# Patient Record
Sex: FEMALE | Race: ASIAN | NOT HISPANIC OR LATINO | Employment: FULL TIME | URBAN - METROPOLITAN AREA
[De-identification: names, ages, dates, MRNs, and addresses within clinical notes are randomized per-mention and may not be internally consistent; named-entity substitution may affect disease eponyms.]

---

## 2018-01-16 ENCOUNTER — OFFICE VISIT (OUTPATIENT)
Dept: URGENT CARE | Facility: URGENT CARE | Age: 48
End: 2018-01-16
Payer: COMMERCIAL

## 2018-01-16 VITALS
RESPIRATION RATE: 20 BRPM | DIASTOLIC BLOOD PRESSURE: 78 MMHG | TEMPERATURE: 98.6 F | WEIGHT: 192.6 LBS | HEART RATE: 94 BPM | SYSTOLIC BLOOD PRESSURE: 112 MMHG

## 2018-01-16 DIAGNOSIS — L98.9 SKIN LESION: Primary | ICD-10-CM

## 2018-01-16 PROCEDURE — 99203 OFFICE O/P NEW LOW 30 MIN: CPT | Performed by: PHYSICIAN ASSISTANT

## 2018-01-16 RX ORDER — HYDROXYZINE HYDROCHLORIDE 25 MG/1
25-50 TABLET, FILM COATED ORAL EVERY 6 HOURS PRN
Qty: 30 TABLET | Refills: 0 | Status: SHIPPED | OUTPATIENT
Start: 2018-01-16

## 2018-01-16 RX ORDER — PREDNISONE 20 MG/1
20 TABLET ORAL DAILY
Qty: 5 TABLET | Refills: 0 | Status: SHIPPED | OUTPATIENT
Start: 2018-01-16

## 2018-01-16 RX ORDER — TRIAMCINOLONE ACETONIDE 5 MG/G
CREAM TOPICAL
Qty: 30 G | Refills: 0 | Status: SHIPPED | OUTPATIENT
Start: 2018-01-16

## 2018-01-16 RX ORDER — CEPHALEXIN 500 MG/1
500 CAPSULE ORAL 3 TIMES DAILY
Qty: 30 CAPSULE | Refills: 0 | Status: SHIPPED | OUTPATIENT
Start: 2018-01-16

## 2018-01-16 NOTE — MR AVS SNAPSHOT
"              After Visit Summary   1/16/2018    Carissa Gordillo    MRN: [id number]           Patient Information     Date Of Birth          1970        Visit Information        Provider Department      1/16/2018 6:00 PM Jaquelin Mccullough PA-C [facility]        Today's Diagnoses     Skin lesion    -  1      Care Instructions    (L98.9) Skin lesion  (primary encounter diagnosis)  Comment:   Plan: predniSONE (DELTASONE) 20 MG tablet,         hydrOXYzine (ATARAX) 25 MG tablet, cephALEXin         (KEFLEX) 500 MG capsule, triamcinolone         (KENALOG) 0.5 % cream            Use Prednisone, Atarax and topical steroid tonight.  Start antibiotic should symptoms persist or worsen.                Follow-ups after your visit        Who to contact     If you have questions or need follow up information about today's clinic visit or your schedule please contact [facility] directly at [phone].  Normal or non-critical lab and imaging results will be communicated to you by Franklyhart, letter or phone within 4 business days after the clinic has received the results. If you do not hear from us within 7 days, please contact the clinic through Franklyhart or phone. If you have a critical or abnormal lab result, we will notify you by phone as soon as possible.  Submit refill requests through [email or web address] or call your pharmacy and they will forward the refill request to us. Please allow 3 business days for your refill to be completed.          Additional Information About Your Visit        Franklyhart Information     [email or web address] lets you send messages to your doctor, view your test results, renew your prescriptions, schedule appointments and more. To sign up, go to [email or web address] . Click on \"Log in\" on the left side of the screen, which will take you to the Welcome page. Then click on \"Sign up Now\" on the right side of the page.     You will be asked to enter the " access code listed below, as well as some personal information. Please follow the directions to create your username and password.     Your access code is: [email or web address]  Expires: 2018  8:24 PM     Your access code will  in 90 days. If you need help or a new code, please call your [place] clinic or [phone].        Care EveryWhere ID     This is your Care EveryWhere ID. This could be used by other organizations to access your [place] medical records  [id number]        Your Vitals Were     Pulse Temperature Respirations Last Period          94 98.6  F (37  C) (Oral) 20 2017 (Approximate)         Blood Pressure from Last 3 Encounters:   18 112/78    Weight from Last 3 Encounters:   18 192 lb 9.6 oz (87.4 kg)              Today, you had the following     No orders found for display         Today's Medication Changes          These changes are accurate as of: 18  8:24 PM.  If you have any questions, ask your nurse or doctor.               Start taking these medicines.        Dose/Directions    cephALEXin 500 MG capsule   Commonly known as:  KEFLEX   Used for:  Skin lesion   Started by:  Jaquelin Mccullough PA-C        Dose:  500 mg   Take 1 capsule (500 mg) by mouth 3 times daily   Quantity:  30 capsule   Refills:  0       hydrOXYzine 25 MG tablet   Commonly known as:  ATARAX   Used for:  Skin lesion   Started by:  Jaquelin Mccullough PA-C        Dose:  25-50 mg   Take 1-2 tablets (25-50 mg) by mouth every 6 hours as needed for itching   Quantity:  30 tablet   Refills:  0       predniSONE 20 MG tablet   Commonly known as:  DELTASONE   Used for:  Skin lesion   Started by:  Jaquelin Mccullough PA-C        Dose:  20 mg   Take 1 tablet (20 mg) by mouth daily   Quantity:  5 tablet   Refills:  0       triamcinolone 0.5 % cream   Commonly known as:  KENALOG   Used for:  Skin lesion   Started by:  Jaquelin Mccullough PA-C        Apply sparingly to affected area  three times daily.   Quantity:  30 g   Refills:  0            Where to get your medicines      These medications were sent to [facility] [place], [place] - [address]  [address] [facility], [facility] [address]     Phone:  [phone]     hydrOXYzine 25 MG tablet    predniSONE 20 MG tablet    triamcinolone 0.5 % cream         Some of these will need a paper prescription and others can be bought over the counter.  Ask your nurse if you have questions.     Bring a paper prescription for each of these medications     cephALEXin 500 MG capsule                Primary Care Provider Office Phone # Fax #    Jazzy Collier [phone]       [facility] [address]  [facility] [address]        Equal Access to Services     SHILPA BOGGS : Hadii barbara forrest hadasho Soomaali, waaxda luqadaha, qaybta kaalmada adeegyada, waxay liin haymaricel hill . So [facility] [phone].    ATENCIÓN: Si habla español, tiene a guerra disposición servicios gratuitos de asistencia lingüística. [facility] [phone].    We comply with applicable federal civil rights laws and [place] laws. We do not discriminate on the basis of race, color, national origin, age, disability, sex, sexual orientation, or gender identity.            Thank you!     Thank you for choosing [facility]  for your care. Our goal is always to provide you with excellent care. Hearing back from our patients is one way we can continue to improve our services. Please take a few minutes to complete the written survey that you may receive in the mail after your visit with us. Thank you!             Your Updated Medication List - Protect others around you: Learn how to safely use, store and throw away your medicines at www.disposemymeds.org.          This list is accurate as of: 1/16/18  8:24 PM.  Always use your most recent med list.                   Brand Name Dispense Instructions for use  Diagnosis    cephALEXin 500 MG capsule    KEFLEX    30 capsule    Take 1 capsule (500 mg) by mouth 3 times daily    Skin lesion       hydrOXYzine 25 MG tablet    ATARAX    30 tablet    Take 1-2 tablets (25-50 mg) by mouth every 6 hours as needed for itching    Skin lesion       predniSONE 20 MG tablet    DELTASONE    5 tablet    Take 1 tablet (20 mg) by mouth daily    Skin lesion       PROGESTERONE PO           triamcinolone 0.5 % cream    KENALOG    30 g    Apply sparingly to affected area three times daily.    Skin lesion       UNKNOWN TO PATIENT      BP medication

## 2018-01-17 NOTE — PROGRESS NOTES
SUBJECTIVE:  Carissa Gordillo is a 47 year old female who presents to the clinic today for itchy and red and sore lesions on both of her arms for one day.  Low grade fever.  Redness has increased in size around each area since being seen in urgency center yesterday.  Lesions are also painful today.    Denies any injury.  Denies any new meds or topical products.    No h/o similar symptoms.      No past medical history on file.     Denies any significant PMH    Denies any significant FH     Allergies   Allergen Reactions     Contrast Dye      Hives      Sulfa Drugs      Nausea and vomit     Social History   Substance Use Topics     Smoking status: Current Every Day Smoker     Smokeless tobacco: Not on file     Alcohol use Not on file       ROS:  CONSTITUTIONAL:as per HPIt  INTEGUMENTARY/SKIN: as per HPI  ENT/MOUTH: NEGATIVE for ear, mouth and throat problems  RESP:NEGATIVE for significant cough or SOB  CV: NEGATIVE for chest pain, palpitations or peripheral edema  MUSCULOSKELETAL: NEGATIVE for significant arthralgias or myalgia    EXAM:   /78  Pulse 94  Temp 98.6  F (37  C) (Oral)  Resp 20  Wt 192 lb 9.6 oz (87.4 kg)  LMP 12/29/2017 (Approximate)  GENERAL: alert, no acute distress.  SKIN: erythematous and indurated lesions on posterior aspect of distal upper arms bilaterally.  Lesion on right is 3cm in diameter, left is 2cm in diameter.  No open wounds.  NO vesicles.  Tender.    EXT: no bony tenderness.    VASC: cap refill is less than 2 seconds in the affected extremities.   NEURO: sensation intact in affected extremities.      (L98.9) Skin lesion  (primary encounter diagnosis)  Comment:   Plan: predniSONE (DELTASONE) 20 MG tablet,         hydrOXYzine (ATARAX) 25 MG tablet, cephALEXin         (KEFLEX) 500 MG capsule, triamcinolone         (KENALOG) 0.5 % cream            Use Prednisone, Atarax and topical steroid tonight.  Start antibiotic should symptoms persist or worsen.      Patient expresses  understanding and agreement with the assessment and plan as above.

## 2018-01-17 NOTE — PATIENT INSTRUCTIONS
(L98.9) Skin lesion  (primary encounter diagnosis)  Comment:   Plan: predniSONE (DELTASONE) 20 MG tablet,         hydrOXYzine (ATARAX) 25 MG tablet, cephALEXin         (KEFLEX) 500 MG capsule, triamcinolone         (KENALOG) 0.5 % cream            Use Prednisone, Atarax and topical steroid tonight.  Start antibiotic should symptoms persist or worsen.

## 2021-03-30 ENCOUNTER — OFFICE VISIT (OUTPATIENT)
Dept: URGENT CARE | Facility: URGENT CARE | Age: 51
End: 2021-03-30
Payer: COMMERCIAL

## 2021-03-30 VITALS
DIASTOLIC BLOOD PRESSURE: 89 MMHG | WEIGHT: 191 LBS | TEMPERATURE: 97.9 F | SYSTOLIC BLOOD PRESSURE: 126 MMHG | RESPIRATION RATE: 20 BRPM | OXYGEN SATURATION: 100 % | HEART RATE: 95 BPM
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
## 2021-03-30 PROCEDURE — 99203 OFFICE O/P NEW LOW 30 MIN: CPT | Performed by: FAMILY MEDICINE

## 2021-03-30 RX ORDER — LISINOPRIL/HYDROCHLOROTHIAZIDE 10-12.5 MG
1 TABLET ORAL
COMMUNITY
Start: 2020-11-04

## 2021-03-30 RX ORDER — EPINEPHRINE 0.3 MG/.3ML
0.3 INJECTION SUBCUTANEOUS
COMMUNITY
Start: 2019-07-02

## 2021-03-30 RX ORDER — CETIRIZINE HYDROCHLORIDE 10 MG/1
10 TABLET ORAL DAILY
Qty: 10 TABLET | Refills: 0 | Status: SHIPPED | OUTPATIENT
Start: 2021-03-30 | End: 2021-04-09

## 2021-03-30 RX ORDER — CEPHALEXIN 500 MG/1
500 CAPSULE ORAL 4 TIMES DAILY
Qty: 40 CAPSULE | Refills: 0 | Status: SHIPPED | OUTPATIENT
Start: 2021-03-30 | End: 2021-04-09

## 2021-03-30 RX ORDER — ACETAMINOPHEN AND CODEINE PHOSPHATE 120; 12 MG/5ML; MG/5ML
0.35 SOLUTION ORAL
COMMUNITY
Start: 2020-07-30 | End: 2021-07-30

## 2021-03-31 NOTE — PROGRESS NOTES
SUBJECTIVE: Carissa Gordillo is a 50 year old female presenting with a chief complaint of bug bite and redness/tender.  Onset of symptoms was thsi am ago.  Course of illness is worsening.    Severity moderate  Treatment measures tried include Antihistamine.  Predisposing factors include None.    History reviewed. No pertinent past medical history.  Allergies   Allergen Reactions     Contrast Dye      Hives      Sulfa Drugs      Nausea and vomit     Social History     Tobacco Use     Smoking status: Current Every Day Smoker     Smokeless tobacco: Never Used   Substance Use Topics     Alcohol use: Not Currently       ROS:  SKIN: no rash  GI: no vomiting    OBJECTIVE:  /89   Pulse 95   Temp 97.9  F (36.6  C)   Resp 20   Wt 86.6 kg (191 lb)   LMP  (LMP Unknown)   SpO2 100% GENERAL APPEARANCE: healthy, alert and no distress  SKIN: red rash/tednes rt forearm and left hand and periumbilical      ICD-10-CM    1. Bug bite with infection, initial encounter  W57.XXXA cephALEXin (KEFLEX) 500 MG capsule     cetirizine (ZYRTEC) 10 MG tablet       Fluids/Rest, f/u if worse/not any better

## 2021-08-06 ENCOUNTER — OFFICE VISIT (OUTPATIENT)
Dept: URGENT CARE | Facility: URGENT CARE | Age: 51
End: 2021-08-06
Payer: COMMERCIAL

## 2021-08-06 VITALS
RESPIRATION RATE: 16 BRPM | HEART RATE: 92 BPM | WEIGHT: 189 LBS | TEMPERATURE: 97.9 F | SYSTOLIC BLOOD PRESSURE: 122 MMHG | OXYGEN SATURATION: 100 % | DIASTOLIC BLOOD PRESSURE: 78 MMHG
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
## 2021-08-06 DIAGNOSIS — S90.465A INSECT BITE OF LESSER TOE OF LEFT FOOT, INITIAL ENCOUNTER: Primary | ICD-10-CM

## 2021-08-06 DIAGNOSIS — Z91.018 MULTIPLE FOOD ALLERGIES: ICD-10-CM

## 2021-08-06 PROCEDURE — 99213 OFFICE O/P EST LOW 20 MIN: CPT | Performed by: PHYSICIAN ASSISTANT

## 2021-08-06 RX ORDER — METHYLPREDNISOLONE 4 MG
TABLET, DOSE PACK ORAL
Qty: 21 TABLET | Refills: 0 | Status: SHIPPED | OUTPATIENT
Start: 2021-08-06

## 2021-08-06 RX ORDER — EPINEPHRINE 0.3 MG/.3ML
0.3 INJECTION SUBCUTANEOUS ONCE
Qty: 0.3 ML | Refills: 1 | Status: SHIPPED | OUTPATIENT
Start: 2021-08-06 | End: 2021-08-06

## 2021-08-06 RX ORDER — CEPHALEXIN 500 MG/1
500 CAPSULE ORAL 3 TIMES DAILY
Qty: 30 CAPSULE | Refills: 0 | Status: SHIPPED | OUTPATIENT
Start: 2021-08-06 | End: 2021-08-16

## 2021-08-06 NOTE — PROGRESS NOTES
Patient presents with:  Urgent Care: Allergic Reaction unknown-last night ate [place] for the first time    (S90.465A,  W57.XXXA) Insect bite of lesser toe of left foot, initial encounter  (primary encounter diagnosis)  Comment:   Plan: methylPREDNISolone (MEDROL DOSEPAK) 4 MG tablet        therapy pack, cephALEXin (KEFLEX) 500 MG         capsule            (Z91.018) Multiple food allergies  Comment:   Plan: EPINEPHrine (ANY BX GENERIC EQUIV) 0.3 MG/0.3ML        injection 2-pack                SUBJECTIVE:   Carissa Gordillo is a 50 year old female who presents today with an itchy and swollen crusting set of sores on her left 2nd and 3rd toes since this morning.      Denies any other symptoms.      Has multiple food allergies for which she needs an epi pen.  Her current one is .        Current Outpatient Medications   Medication Sig Dispense Refill     Multiple Vitamins-Iron (DAILY-RABIA/IRON/BETA-CAROTENE) TABS TAKE 1 TABLET BY MOUTH DAILY. (Patient not taking: Reported on 10/19/2020) 30 tablet 7     Social History     Tobacco Use     Smoking status: Never Smoker     Smokeless tobacco: Never Used   Substance Use Topics     Alcohol use: Not on file     Family History   Problem Relation Age of Onset     Diabetes Mother      Diabetes Father          ROS:    10 point ROS of systems including Constitutional, Eyes, Respiratory, Cardiovascular, Gastroenterology, Genitourinary, Integumentary, Muscularskeletal, Psychiatric ,neurological were all negative except for pertinent positives noted in my HPI       OBJECTIVE:  /78   Pulse 92   Temp 97.9  F (36.6  C) (Temporal)   Resp 16   Wt 85.7 kg (189 lb)   SpO2 100%   Physical Exam:  GENERAL APPEARANCE: healthy, alert and no distress  EYES: EOMI,  PERRL, conjunctiva clear  HENT: ear canals and TM's normal.  Nose and mouth without ulcers, erythema or lesions  NECK: supple, nontender, no lymphadenopathy  RESP: lungs clear to auscultation - no rales, rhonchi  or wheezes  CV: regular rates and rhythm, normal S1 S2, no murmur noted  SKIN: erythematous and mildly edematous skin on 2nd and 3rd toes with crusting on left foot.    One excoriated papule on right upper arm..  No other lesions

## 2021-08-06 NOTE — PATIENT INSTRUCTIONS
(S90.465A,  W57.XXXA) Insect bite of lesser toe of left foot, initial encounter  (primary encounter diagnosis)  Comment:   Plan: methylPREDNISolone (MEDROL DOSEPAK) 4 MG tablet        therapy pack, cephALEXin (KEFLEX) 500 MG         Capsule  Take generic zyrtec once daily for a week.      Local wound care daily            (Z91.018) Multiple food allergies  Comment:   Plan: EPINEPHrine (ANY BX GENERIC EQUIV) 0.3 MG/0.3ML        injection 2-pack

## 2021-08-29 ENCOUNTER — HOSPITAL ENCOUNTER (EMERGENCY)
Facility: CLINIC | Age: 51
Discharge: HOME | End: 2021-08-30
Attending: EMERGENCY MEDICINE | Admitting: EMERGENCY MEDICINE
Payer: COMMERCIAL

## 2021-08-29 DIAGNOSIS — U07.1 INFECTION DUE TO 2019 NOVEL CORONAVIRUS: ICD-10-CM

## 2021-08-29 PROCEDURE — 99285 EMERGENCY DEPT VISIT HI MDM: CPT | Mod: 25

## 2021-08-29 PROCEDURE — 93005 ELECTROCARDIOGRAM TRACING: CPT | Performed by: EMERGENCY MEDICINE

## 2021-08-29 RX ORDER — SODIUM CHLORIDE 9 MG/ML
INJECTION, SOLUTION INTRAVENOUS CONTINUOUS
Status: DISCONTINUED | OUTPATIENT
Start: 2021-08-29 | End: 2021-08-30 | Stop reason: HOSPADM

## 2021-08-29 RX ORDER — KETOROLAC TROMETHAMINE 15 MG/ML
15 INJECTION, SOLUTION INTRAMUSCULAR; INTRAVENOUS ONCE
Status: COMPLETED | OUTPATIENT
Start: 2021-08-29 | End: 2021-08-30

## 2021-08-30 ENCOUNTER — APPOINTMENT (OUTPATIENT)
Dept: RADIOLOGY | Facility: CLINIC | Age: 51
End: 2021-08-30
Attending: EMERGENCY MEDICINE
Payer: COMMERCIAL

## 2021-08-30 ENCOUNTER — APPOINTMENT (OUTPATIENT)
Dept: NUCLEAR MEDICINE | Facility: CLINIC | Age: 51
End: 2021-08-30
Attending: EMERGENCY MEDICINE
Payer: COMMERCIAL

## 2021-08-30 VITALS
TEMPERATURE: 99.7 F | OXYGEN SATURATION: 95 % | SYSTOLIC BLOOD PRESSURE: 130 MMHG | RESPIRATION RATE: 19 BRPM | HEART RATE: 90 BPM | DIASTOLIC BLOOD PRESSURE: 62 MMHG

## 2021-08-30 LAB
ANION GAP SERPL CALCULATED.3IONS-SCNC: 14 MMOL/L (ref 5–18)
ATRIAL RATE - MUSE: 99 BPM
BASOPHILS # BLD AUTO: 0 10E3/UL (ref 0–0.2)
BASOPHILS NFR BLD AUTO: 0 %
BUN SERPL-MCNC: 15 MG/DL (ref 8–22)
CALCIUM SERPL-MCNC: 8.9 MG/DL (ref 8.5–10.5)
CHLORIDE BLD-SCNC: 95 MMOL/L (ref 98–107)
CO2 SERPL-SCNC: 25 MMOL/L (ref 22–31)
CREAT SERPL-MCNC: 1 MG/DL (ref 0.6–1.1)
D DIMER PPP FEU-MCNC: 0.98 UG/ML FEU (ref 0–0.5)
DIASTOLIC BLOOD PRESSURE - MUSE: NORMAL MMHG
EOSINOPHIL # BLD AUTO: 0 10E3/UL (ref 0–0.7)
EOSINOPHIL NFR BLD AUTO: 0 %
ERYTHROCYTE [DISTWIDTH] IN BLOOD BY AUTOMATED COUNT: 11.5 % (ref 10–15)
GFR SERPL CREATININE-BSD FRML MDRD: 66 ML/MIN/1.73M2
GLUCOSE BLD-MCNC: 98 MG/DL (ref 70–125)
HCT VFR BLD AUTO: 37.9 % (ref 35–47)
HGB BLD-MCNC: 12.9 G/DL (ref 11.7–15.7)
IMM GRANULOCYTES # BLD: 0 10E3/UL
IMM GRANULOCYTES NFR BLD: 1 %
INTERPRETATION ECG - MUSE: NORMAL
LYMPHOCYTES # BLD AUTO: 0.8 10E3/UL (ref 0.8–5.3)
LYMPHOCYTES NFR BLD AUTO: 15 %
MCH RBC QN AUTO: 31 PG (ref 26.5–33)
MCHC RBC AUTO-ENTMCNC: 34 G/DL (ref 31.5–36.5)
MCV RBC AUTO: 91 FL (ref 78–100)
MONOCYTES # BLD AUTO: 0.5 10E3/UL (ref 0–1.3)
MONOCYTES NFR BLD AUTO: 9 %
NEUTROPHILS # BLD AUTO: 4.1 10E3/UL (ref 1.6–8.3)
NEUTROPHILS NFR BLD AUTO: 75 %
NRBC # BLD AUTO: 0 10E3/UL
NRBC BLD AUTO-RTO: 0 /100
P AXIS - MUSE: 55 DEGREES
PLATELET # BLD AUTO: 227 10E3/UL (ref 150–450)
POTASSIUM BLD-SCNC: 3.6 MMOL/L (ref 3.5–5)
PR INTERVAL - MUSE: 122 MS
QRS DURATION - MUSE: 78 MS
QT - MUSE: 364 MS
QTC - MUSE: 467 MS
R AXIS - MUSE: 66 DEGREES
RBC # BLD AUTO: 4.16 10E6/UL (ref 3.8–5.2)
SODIUM SERPL-SCNC: 134 MMOL/L (ref 136–145)
SYSTOLIC BLOOD PRESSURE - MUSE: NORMAL MMHG
T AXIS - MUSE: 55 DEGREES
VENTRICULAR RATE- MUSE: 99 BPM
WBC # BLD AUTO: 5.4 10E3/UL (ref 4–11)

## 2021-08-30 PROCEDURE — 258N000003 HC RX IP 258 OP 636: Performed by: EMERGENCY MEDICINE

## 2021-08-30 PROCEDURE — 96361 HYDRATE IV INFUSION ADD-ON: CPT

## 2021-08-30 PROCEDURE — 36415 COLL VENOUS BLD VENIPUNCTURE: CPT | Performed by: EMERGENCY MEDICINE

## 2021-08-30 PROCEDURE — 78580 LUNG PERFUSION IMAGING: CPT

## 2021-08-30 PROCEDURE — 80048 BASIC METABOLIC PNL TOTAL CA: CPT | Performed by: EMERGENCY MEDICINE

## 2021-08-30 PROCEDURE — 250N000011 HC RX IP 250 OP 636: Performed by: EMERGENCY MEDICINE

## 2021-08-30 PROCEDURE — 343N000001 HC RX 343: Performed by: EMERGENCY MEDICINE

## 2021-08-30 PROCEDURE — 96374 THER/PROPH/DIAG INJ IV PUSH: CPT | Mod: 59

## 2021-08-30 PROCEDURE — 85379 FIBRIN DEGRADATION QUANT: CPT | Performed by: EMERGENCY MEDICINE

## 2021-08-30 PROCEDURE — 71045 X-RAY EXAM CHEST 1 VIEW: CPT

## 2021-08-30 PROCEDURE — A9540 TC99M MAA: HCPCS | Performed by: EMERGENCY MEDICINE

## 2021-08-30 PROCEDURE — 85025 COMPLETE CBC W/AUTO DIFF WBC: CPT | Performed by: EMERGENCY MEDICINE

## 2021-08-30 RX ADMIN — KETOROLAC TROMETHAMINE 15 MG: 15 INJECTION, SOLUTION INTRAMUSCULAR; INTRAVENOUS at 00:38

## 2021-08-30 RX ADMIN — KIT FOR THE PREPARATION OF TECHNETIUM TC 99M ALBUMIN AGGREGATED 7 MILLICURIE: 2.5 INJECTION, POWDER, FOR SOLUTION INTRAVENOUS at 07:04

## 2021-08-30 RX ADMIN — SODIUM CHLORIDE 1000 ML: 9 INJECTION, SOLUTION INTRAVENOUS at 00:37

## 2021-08-30 ASSESSMENT — ENCOUNTER SYMPTOMS
ABDOMINAL PAIN: 0
SHORTNESS OF BREATH: 1
MYALGIAS: 1
FEVER: 0
COUGH: 1

## 2021-08-30 NOTE — ED NOTES
Patient ambulated in room on room air with pulse oximetry. Patient's oxygen saturation remains at or above 92%. Provider updated.

## 2021-08-30 NOTE — ED TRIAGE NOTES
Pt to the ED from Urgency Room with an increased complaint of SOB - pt tested positive for Covid 8/20 - since then she states she has something new each day. Pt states she hasn't eaten x 3 weeks, she has no taste or smell. Pt is alert, orient and appropriate. She is speaking clearly and completely with no increased distress.

## 2021-08-30 NOTE — DISCHARGE INSTRUCTIONS
No signs of blood clots on the VQ Study.  I suspect all of your symptoms are just related to having the Covid infection.  Continue to quarantine yourself.    Use the pulse ox monitor at home to monitor your oxygen level.  If it is persistently below 92 while sitting or resting OR below 90 with walking then please return to the emergency department for reevaluation.    Otherwise return to the emergency department with worse shortness of breath, worse chest pain, or any other concerns.    Thank you for choosing [facility] Emergency Department.  It has been my pleasure caring for you today.     ~Dr. Delta MD

## 2021-08-30 NOTE — ED PROVIDER NOTES
This patient is a 50-year-old female who is being sent to the ER from the urgency room because of Covid pneumonia and hypoxia.  She was diagnosed with Covid 3 weeks ago.  6 days ago she was seen and at that time her D-dimer was negative.  Today she went to the emergency room where her chest x-ray showed Covid pneumonia infiltrates.  Her resting O2 sats were 88% to 92% on room air but she was feeling lightheaded.  The physician assistant attempted to find a inpatient bed at one of the hospitals in the [facility] but none were available for at least the next 6 hours.  He is sending her here for admission and treatment for the Covid pneumonia.     Bam Bryan MD  08/29/21 2029

## 2021-08-30 NOTE — ED PROVIDER NOTES
EMERGENCY DEPARTMENT ENCOUNTER      NAME: Carissa Gordillo  AGE: 50 year old female  YOB: 1970  MRN: [id number]  EVALUATION DATE & TIME: 2021 10:28 PM    PCP: Jazzy Collier    ED PROVIDER: Soraay Sorenson M.D.        Chief Complaint   Patient presents with     Shortness of Breath     Covid Concern         FINAL IMPRESSION:    1. Infection due to 2019 novel coronavirus          Patient was signed out to me at change of shift by Dr. Dr. Amari Mendoza at 5:58 AM. Please see their note for full HPI, exam and plan.    MEDICAL DECISION MAKIN year old female who was diagnosed with Covid about 10 days ago and presents emergency department from the urgency room with reports of shortness of breath.  Seen by Dr. Mendoza initially.  Found to have elevated D-dimer.  Patient unable to have CT scan due to reported allergy.  VQ scan was ordered and completed.  Very low probability for PE was reported by the radiologist.  Patient was ambulated in the ER and the lowest her saturations got were 92%.  At rest her sats are 95-98% on room air.  At this time we discussed disposition and plan.  Ultimately she will be discharged home with a pulse ox to use at home.  We discussed what to watch for and when to return to the ER.  All of her questions have been answered.    Patient is not eligible for Covid vaccine to reported allergies.      ED COURSE:  5:58 AM  Patient was signed out to me at change of shift by Dr. Amari Mendoza. Please see their note for full HPI, exam and plan. Pt was supposed to have VQ scan at ~4am but VQ scan tech went to the wrong hospital and then cancelled the study when they didn't see patient. Now awaiting them to show up at  to complete study.    7:03 AM  Pt is at VQ scan now.    7:54 AM  VQ scan has resulted and is reported as very low probability for PE. Will do ambulation challenge and check pOx on RA.    8:25 AM  Pt was ambulated in her room by ED tech. Pox remained above 92% on RA  entire time.     8:48 AM  I spoke with the patient.  At rest on room air her sats are 95-98%.  No labored breathing.  Spoke with her about what to expect with regards to Covid and feeling shortness of breath.  She does not appear toxic.  She is not eligible for the vaccine due to history of allergies.  Patient does have a pulse ox at home but is not sure that she understands how to use it and therefore we have decided to provide her with a pulse ox from here and have the nurse review this with her.  She understands what to watch for and when to return to the ER.  We also discussed the expected time course for recovery.  All of her questions have been answered.  Do not think she requires any antibiotics at this time.    I do not think that this represents rib fractures, allergic reaction, COPD exacerbation, asthma exacerbation, bacterial pneumonia, CHF, myocarditis, pericarditis, endocarditis, ACS, PE, ruptured AAA, pneumothorax, aortic dissection, bowel obstruction, or other such etiologies at this time.      COVID-19 PPE worn during patient evaluation:  Mask: n95 and homemade masks  Eye Protection: goggles  Gown: reusable  Hair cover: yes  Face shield: yes   Patient wearing a mask: yes (but pulls it down to talk)        At the conclusion of the encounter I discussed the results of all of the tests and the disposition. Their questions were answered. The patient (and any family present) acknowledged understanding and were agreeable with the care plan.      CONSULTANTS:  none          MEDICATIONS GIVEN IN THE EMERGENCY:  Medications   0.9% sodium chloride BOLUS (0 mLs Intravenous Stopped 8/30/21 0501)     Followed by   sodium chloride 0.9% infusion (has no administration in time range)   ketorolac (TORADOL) injection 15 mg (15 mg Intravenous Given 8/30/21 0038)   technetium pertechnetate with albumin (Tc99m MAA) radioisotope injection 6-9 millicurie (7 millicuries Intravenous Given 8/30/21 0704)             NEW  PRESCRIPTIONS STARTED AT TODAY'S ER VISIT     Medication List      There are no discharge medications for this visit.             CONDITION:  stable        DISPOSITION:  discharge home         =================================================================  =================================================================      Patient was signed out to me at change of shift by Dr. Mendoza at 5:58 AM. Please see their note for full HPI, exam and plan.        HPI    Carissa Gordillo is a 50 year old female with history of COVID on 8/20/21 who presents to the ER with complaints of SOB and some chest tightness. She is not eligible for COVID shot due to reported allergies. DDimer elevated and allergy to CT contrast. VQ scan ordered and pending. Plan per Dr. Mendoza is if VQ scan looks good then likely discharge home with pulse ox minitor for home use.        PAST MEDICAL HISTORY:  History reviewed. No pertinent past medical history.      PAST SURGICAL HISTORY:  History reviewed. No pertinent surgical history.      CURRENT MEDICATIONS:    Prior to Admission medications    Medication Sig Start Date End Date Taking? Authorizing Provider   cephALEXin (KEFLEX) 500 MG capsule Take 1 capsule (500 mg) by mouth 3 times daily  Patient not taking: Reported on 3/30/2021 1/16/18   Jaquelin Mccullough PA-C   EPINEPHrine (ANY BX GENERIC EQUIV) 0.3 MG/0.3ML injection 2-pack Inject 0.3 mg into the muscle  Patient not taking: Reported on 8/6/2021 7/2/19   Reported, Patient   hydrOXYzine (ATARAX) 25 MG tablet Take 1-2 tablets (25-50 mg) by mouth every 6 hours as needed for itching  Patient not taking: Reported on 3/30/2021 1/16/18   Jaquelin Mccullough PA-C   lisinopril-hydrochlorothiazide (ZESTORETIC) 10-12.5 MG tablet Take 1 tablet by mouth 11/4/20   Reported, Patient   methylPREDNISolone (MEDROL DOSEPAK) 4 MG tablet therapy pack Follow Package Directions 8/6/21   Jaquelin Mccullough PA-C   norethindrone (MICRONOR) 0.35  MG tablet Take 0.35 mg by mouth 7/30/20 7/30/21  Reported, Patient   predniSONE (DELTASONE) 20 MG tablet Take 1 tablet (20 mg) by mouth daily  Patient not taking: Reported on 3/30/2021 1/16/18   Jaquelin Mccullough PA-C   Progesterone Micronized (PROGESTERONE PO)     Reported, Patient   triamcinolone (KENALOG) 0.5 % cream Apply sparingly to affected area three times daily. 1/16/18   Jaquelin Mccullough PA-C   UNKNOWN TO PATIENT BP medication    Reported, Patient         ALLERGIES:  Allergies   Allergen Reactions     Contrast Dye      Hives      Sulfa Drugs      Nausea and vomit         FAMILY HISTORY:  History reviewed. No pertinent family history.      SOCIAL HISTORY:  Social History     Socioeconomic History     Marital status: Single     Spouse name: None     Number of children: None     Years of education: None     Highest education level: None   Occupational History     None   Tobacco Use     Smoking status: Current Every Day Smoker     Smokeless tobacco: Never Used   Substance and Sexual Activity     Alcohol use: Not Currently     Drug use: Never     Sexual activity: Not Currently   Other Topics Concern     None   Social History Narrative     None     Social Determinants of Health     Financial Resource Strain:      Difficulty of Paying Living Expenses:    Food Insecurity:      Worried About Running Out of Food in the Last Year:      Ran Out of Food in the Last Year:    Transportation Needs:      Lack of Transportation (Medical):      Lack of Transportation (Non-Medical):    Physical Activity:      Days of Exercise per Week:      Minutes of Exercise per Session:    Stress:      Feeling of Stress :    Social Connections:      Frequency of Communication with Friends and Family:      Frequency of Social Gatherings with Friends and Family:      Attends [employer] Services:      Active Member of Clubs or Organizations:      Attends Club or Organization Meetings:      Marital Status:    Intimate Partner  Violence:      Fear of Current or Ex-Partner:      Emotionally Abused:      Physically Abused:      Sexually Abused:          VITALS:  Patient Vitals for the past 24 hrs:   BP Temp Temp src Pulse Resp SpO2   08/30/21 0831 130/62 -- -- 90 19 95 %   08/30/21 0821 127/81 -- -- 95 29 94 %   08/30/21 0700 -- -- -- 89 25 --   08/30/21 0645 -- -- -- 68 21 98 %   08/30/21 0630 -- -- -- 67 24 96 %   08/30/21 0615 -- -- -- 66 21 96 %   08/30/21 0600 -- -- -- 73 20 98 %   08/30/21 0545 -- -- -- 75 19 97 %   08/30/21 0530 -- -- -- 67 19 96 %   08/30/21 0515 -- -- -- 68 18 96 %   08/30/21 0500 -- -- -- 69 8 96 %   08/30/21 0445 -- -- -- 77 22 97 %   08/30/21 0430 -- -- -- 72 21 97 %   08/30/21 0415 -- -- -- 75 20 97 %   08/30/21 0400 -- -- -- 71 21 96 %   08/30/21 0330 -- -- -- 80 20 98 %   08/30/21 0315 -- -- -- 79 (!) 6 95 %   08/30/21 0300 -- -- -- 77 22 96 %   08/30/21 0245 -- -- -- 79 25 96 %   08/30/21 0230 -- -- -- 80 19 96 %   08/30/21 0215 -- -- -- 81 19 96 %   08/30/21 0200 -- -- -- 81 20 96 %   08/30/21 0145 -- -- -- 88 21 99 %   08/30/21 0130 -- -- -- 91 (!) 43 97 %   08/30/21 0115 -- -- -- 79 19 96 %   08/30/21 0100 -- -- -- 84 17 96 %   08/30/21 0045 -- -- -- 89 23 95 %   08/29/21 2048 102/53 99.7  F (37.6  C) Oral 101 22 95 %       Wt Readings from Last 3 Encounters:   08/06/21 85.7 kg (189 lb)   03/30/21 86.6 kg (191 lb)   01/16/18 87.4 kg (192 lb 9.6 oz)         PHYSICAL EXAM    Please see initial providers note for detailed physical exam        LAB:  All pertinent labs reviewed and interpreted.  Recent Results (from the past 24 hour(s))   ECG 12-LEAD WITH MUSE (LHE)    Collection Time: 08/29/21 11:05 PM   Result Value Ref Range    Systolic Blood Pressure  mmHg    Diastolic Blood Pressure  mmHg    Ventricular Rate 99 BPM    Atrial Rate 99 BPM    ME Interval 122 ms    QRS Duration 78 ms     ms    QTc 467 ms    P Axis 55 degrees    R AXIS 66 degrees    T Axis 55 degrees    Interpretation ECG       Sinus  rhythm  Normal ECG  No previous ECGs available  Confirmed by SEE ED PROVIDER NOTE FOR, ECG INTERPRETATION ([id number]),  GEOVANY CORNELIUS ([id number]) on 8/30/2021 2:18:53 AM     D dimer quantitative    Collection Time: 08/30/21 12:36 AM   Result Value Ref Range    D-Dimer Quantitative 0.98 (H) 0.00 - 0.50 ug/mL FEU   Basic metabolic panel    Collection Time: 08/30/21 12:36 AM   Result Value Ref Range    Sodium 134 (L) 136 - 145 mmol/L    Potassium 3.6 3.5 - 5.0 mmol/L    Chloride 95 (L) 98 - 107 mmol/L    Carbon Dioxide (CO2) 25 22 - 31 mmol/L    Anion Gap 14 5 - 18 mmol/L    Urea Nitrogen 15 8 - 22 mg/dL    Creatinine 1.00 0.60 - 1.10 mg/dL    Calcium 8.9 8.5 - 10.5 mg/dL    Glucose 98 70 - 125 mg/dL    GFR Estimate 66 >60 mL/min/1.73m2   CBC with platelets and differential    Collection Time: 08/30/21 12:36 AM   Result Value Ref Range    WBC Count 5.4 4.0 - 11.0 10e3/uL    RBC Count 4.16 3.80 - 5.20 10e6/uL    Hemoglobin 12.9 11.7 - 15.7 g/dL    Hematocrit 37.9 35.0 - 47.0 %    MCV 91 78 - 100 fL    MCH 31.0 26.5 - 33.0 pg    MCHC 34.0 31.5 - 36.5 g/dL    RDW 11.5 10.0 - 15.0 %    Platelet Count 227 150 - 450 10e3/uL    % Neutrophils 75 %    % Lymphocytes 15 %    % Monocytes 9 %    % Eosinophils 0 %    % Basophils 0 %    % Immature Granulocytes 1 %    NRBCs per 100 WBC 0 <1 /100    Absolute Neutrophils 4.1 1.6 - 8.3 10e3/uL    Absolute Lymphocytes 0.8 0.8 - 5.3 10e3/uL    Absolute Monocytes 0.5 0.0 - 1.3 10e3/uL    Absolute Eosinophils 0.0 0.0 - 0.7 10e3/uL    Absolute Basophils 0.0 0.0 - 0.2 10e3/uL    Absolute Immature Granulocytes 0.0 <=0.0 10e3/uL    Absolute NRBCs 0.0 10e3/uL       No results found for: [facility]        RADIOLOGY:  Reviewed all pertinent imaging. Please see official radiology report.    NM Lung Scan Perfusion Particulate   Final Result   IMPRESSION:       1.  Sparse areas of nonsegmental, patchy decreased perfusion with matched abnormalities on radiograph consistent with inflammatory hypoperfusion.     2.  Very low probability of acute pulmonary embolism.      XR Chest Port 1 View   Final Result   IMPRESSION:    1. No convincing interval change since the recent comparison study.   2. A few ill-defined hazy opacities scattered in the periphery of both lungs are again noted and are most likely infectious or inflammatory in etiology.             EKG:    none    PROCEDURES:  none      Soraya Sorenson M.D. [facility]  Emergency Medicine and Medical Toxicology  Formerly [facility] EMERGENCY ROOM  [address] [id number] [phone]  Dept: [phone]         Soraya Sorenson MD  08/30/21 1177

## 2021-08-30 NOTE — ED PROVIDER NOTES
EMERGENCY DEPARTMENT ENCOUNTER      NAME: Carissa Gordillo  AGE: 50 year old female  YOB: 1970  MRN: [id number]  EVALUATION DATE & TIME: 8/29/2021 10:28 PM    PCP: Jazzy Collier    ED PROVIDER: Fadi Mendoza M.D.      Chief Complaint   Patient presents with     Shortness of Breath     Covid Concern         FINAL IMPRESSION:  1. Infection due to 2019 novel coronavirus          ED COURSE & MEDICAL DECISION MAKING:    Pertinent Labs & Imaging studies reviewed. (See chart for details)  50 year old female presents to the Emergency Department for evaluation of shortness of breath.  Patient has a history of Covid.  Was diagnosed a couple of weeks ago.  Has continued worsening cough and fatigue.  Worsening shortness of breath.  Given her prolonged course and tachycardia had some concern for possible PE.  D-dimer is elevated.  Chest x-ray shows some interstitial infiltrates.  Unfortunately patient is allergic to IV contrast so we will do a VQ scan.  This somewhat delayed and will get done at 630 this morning.  Labs otherwise unremarkable.  EKG is normal.  Patient be signed out to the oncoming physician.  Plan at time of signout is to follow-up on VQ scan.  If PE may need to be admitted.  Otherwise will need to be reevaluated for oxygen status and possibly discharged home with a pulse ox.    10:38 PM I met with the patient to gather history and to perform my initial exam. I discussed the plan for care while in the Emergency Department. PPE: N95 mask, gown, gloves, safety glasses.  1:30 AM I discussed the lab results. Plan for VQ scan.  2:28 AM Nuclear medicine will be able to do the VQ scan at 0400.    At the conclusion of the encounter I discussed the results of all of the tests and the disposition. The questions were answered. The patient or family acknowledged understanding and was agreeable with the care plan.            MEDICATIONS GIVEN IN THE EMERGENCY:  Medications   0.9% sodium chloride BOLUS (0  mLs Intravenous Stopped 8/30/21 0501)     Followed by   sodium chloride 0.9% infusion (has no administration in time range)   ketorolac (TORADOL) injection 15 mg (15 mg Intravenous Given 8/30/21 0038)       NEW PRESCRIPTIONS STARTED AT TODAY'S ER VISIT  New Prescriptions    No medications on file          =================================================================    HPI    Patient information was obtained from: Patient    Use of : N/A       Carissa Gordillo is a 50 year old female with a pertinent history of COVID positive 8/21 (8 days ago), HTN, obesity, who presents to this ED by private car for evaluation of shortness of breath.  Patient tested positive on the 20th.  She has been sick for the last few weeks.  Started with cough now has noticed the smell.  She has had worsening shortness of breath.  Was seen at the urgency room and sent here.  Per them had a mild desaturation with walking.  Cough has been nonproductive.  No nausea or vomiting.  No leg pain or swelling.  Is on birth control.      REVIEW OF SYSTEMS   Review of Systems   Constitutional: Negative for fever.   Respiratory: Positive for cough and shortness of breath.    Cardiovascular: Positive for chest pain.   Gastrointestinal: Negative for abdominal pain.   Musculoskeletal: Positive for myalgias.   All other systems reviewed and are negative.       PAST MEDICAL HISTORY:  History reviewed. No pertinent past medical history.    PAST SURGICAL HISTORY:  History reviewed. No pertinent surgical history.        CURRENT MEDICATIONS:    Current Facility-Administered Medications   Medication     sodium chloride 0.9% infusion     Current Outpatient Medications   Medication     cephALEXin (KEFLEX) 500 MG capsule     EPINEPHrine (ANY BX GENERIC EQUIV) 0.3 MG/0.3ML injection 2-pack     hydrOXYzine (ATARAX) 25 MG tablet     lisinopril-hydrochlorothiazide (ZESTORETIC) 10-12.5 MG tablet     methylPREDNISolone (MEDROL DOSEPAK) 4 MG tablet therapy pack      norethindrone (MICRONOR) 0.35 MG tablet     predniSONE (DELTASONE) 20 MG tablet     Progesterone Micronized (PROGESTERONE PO)     triamcinolone (KENALOG) 0.5 % cream     UNKNOWN TO PATIENT         ALLERGIES:  Allergies   Allergen Reactions     Contrast Dye      Hives      Sulfa Drugs      Nausea and vomit       FAMILY HISTORY:  History reviewed. No pertinent family history.    SOCIAL HISTORY:   Social History     Socioeconomic History     Marital status: Single     Spouse name: None     Number of children: None     Years of education: None     Highest education level: None   Occupational History     None   Tobacco Use     Smoking status: Current Every Day Smoker     Smokeless tobacco: Never Used   Substance and Sexual Activity     Alcohol use: Not Currently     Drug use: Never     Sexual activity: Not Currently   Other Topics Concern     None   Social History Narrative     None     Social Determinants of Health     Financial Resource Strain:      Difficulty of Paying Living Expenses:    Food Insecurity:      Worried About Running Out of Food in the Last Year:      Ran Out of Food in the Last Year:    Transportation Needs:      Lack of Transportation (Medical):      Lack of Transportation (Non-Medical):    Physical Activity:      Days of Exercise per Week:      Minutes of Exercise per Session:    Stress:      Feeling of Stress :    Social Connections:      Frequency of Communication with Friends and Family:      Frequency of Social Gatherings with Friends and Family:      Attends Orthodoxy Services:      Active Member of Clubs or Organizations:      Attends Club or Organization Meetings:      Marital Status:    Intimate Partner Violence:      Fear of Current or Ex-Partner:      Emotionally Abused:      Physically Abused:      Sexually Abused:        VITALS:  /53   Pulse 77   Temp 99.7  F (37.6  C) (Oral)   Resp 22   LMP 08/24/2021 (Approximate)   SpO2 97%   Breastfeeding No     PHYSICAL EXAM     Physical Exam  Constitutional:       General: She is not in acute distress.     Appearance: She is not diaphoretic.   HENT:      Head: Atraumatic.      Mouth/Throat:      Pharynx: No oropharyngeal exudate.   Eyes:      General: No scleral icterus.     Pupils: Pupils are equal, round, and reactive to light.   Cardiovascular:      Heart sounds: Normal heart sounds.   Pulmonary:      Effort: No respiratory distress.      Breath sounds: Normal breath sounds.   Abdominal:      General: Bowel sounds are normal.      Palpations: Abdomen is soft.      Tenderness: There is no abdominal tenderness. There is no guarding or rebound.   Musculoskeletal:         General: No tenderness.   Skin:     General: Skin is warm.      Findings: No rash.   Neurological:      General: No focal deficit present.      Mental Status: She is alert.           LAB:  All pertinent labs reviewed and interpreted.  Labs Ordered and Resulted from Time of ED Arrival Up to the Time of Departure from the ED   D DIMER QUANTITATIVE - Abnormal; Notable for the following components:       Result Value    D-Dimer Quantitative 0.98 (*)     All other components within normal limits    Narrative:     This D-dimer assay is intended for use in conjunction with a clinical pretest probability assessment model to exclude pulmonary embolism (PE) and deep venous thrombosis (DVT) in outpatients suspected of PE or DVT. The cut-off value is 0.50 ug/mL FEU.   BASIC METABOLIC PANEL - Abnormal; Notable for the following components:    Sodium 134 (*)     Chloride 95 (*)     All other components within normal limits   CBC WITH PLATELETS & DIFFERENTIAL    Narrative:     The following orders were created for panel order CBC with platelets differential.  Procedure                               Abnormality         Status                     ---------                               -----------         ------                     CBC with platelets and d...[[id number]]                       Final result                 Please view results for these tests on the individual orders.   CBC WITH PLATELETS AND DIFFERENTIAL   PERIPHERAL IV CATHETER   PULSE OXIMETRY NURSING   CARDIAC CONTINUOUS MONITORING       RADIOLOGY:  Reviewed all pertinent imaging. Please see official radiology report.  XR Chest Port 1 View   Final Result   IMPRESSION:    1. No convincing interval change since the recent comparison study.   2. A few ill-defined hazy opacities scattered in the periphery of both lungs are again noted and are most likely infectious or inflammatory in etiology.       NM Lung Scan Perfusion Particulate    (Results Pending)       EKG:    Performed at: 2305  Impression: Normal EKG.  No previous  Normal sinus rhythm.  99.  Interval 122.  QRS 78.  QTc 467    I have independently reviewed and interpreted the EKG(s) documented above.          I, Dinah Callahan, am serving as a scribe to document services personally performed by Dr. Fadi Mendoza, based on my observation and the provider's statements to me. I, Fadi Mendoza MD attest that Dinah Callahan is acting in a scribe capacity, has observed my performance of the services and has documented them in accordance with my direction.    Fadi Mendoza M.D.  Emergency Medicine  [facility] EMERGENCY ROOM  [address] [id number] [phone]  Dept: [phone]     Fadi Mendoza MD  08/30/21 0560

## 2023-08-28 ENCOUNTER — OFFICE VISIT (OUTPATIENT)
Dept: URGENT CARE | Facility: URGENT CARE | Age: 53
End: 2023-08-28
Payer: COMMERCIAL

## 2023-08-28 VITALS
SYSTOLIC BLOOD PRESSURE: 132 MMHG | DIASTOLIC BLOOD PRESSURE: 85 MMHG | OXYGEN SATURATION: 100 % | HEART RATE: 77 BPM | WEIGHT: 186 LBS | TEMPERATURE: 98.5 F
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
## 2023-08-28 PROCEDURE — 99213 OFFICE O/P EST LOW 20 MIN: CPT | Performed by: FAMILY MEDICINE

## 2023-08-28 RX ORDER — METHYLPREDNISOLONE 4 MG
TABLET, DOSE PACK ORAL
Qty: 21 TABLET | Refills: 0 | Status: SHIPPED | OUTPATIENT
Start: 2023-08-28

## 2023-08-28 NOTE — PROGRESS NOTES
SUBJECTIVE: Carissa Gordillo is a 52 year old female presenting with a chief complaint of left foot bug bite and swelling.  Onset of symptoms was 1 day(s) ago.  Course of illness is worsening.    Severity moderate  Current and Associated symptoms: none  Treatment measures tried include Antihistamine.  Predisposing factors include possible bee sting.    No past medical history on file.  Allergies   Allergen Reactions    Contrast Dye      Hives     Sulfa Antibiotics      Nausea and vomit     Social History     Tobacco Use    Smoking status: Every Day    Smokeless tobacco: Never   Substance Use Topics    Alcohol use: Not Currently       ROS:  SKIN: no rash  GI: no vomiting    OBJECTIVE:  /85   Pulse 77   Temp 98.5  F (36.9  C) (Tympanic)   Wt 84.4 kg (186 lb)   SpO2 100% GENERAL APPEARANCE: healthy, alert and no distress  NEURO: Normal strength and tone, sensory exam grossly normal,  normal speech and mentation  SKIN: left foot mild sweling      ICD-10-CM    1. Bug bite, initial encounter  W57.XXXA methylPREDNISolone (MEDROL DOSEPAK) 4 MG tablet therapy pack        Cont OTC anti histamine  Fluids/Rest, f/u if worse/not any better

## 2023-08-28 NOTE — LETTER
August 28, 2023      Carissa Gordillo  [address] [place] [id number]        To Whom It May Concern:    Carissa Gordillo was seen in our clinic. She may return to work today without restrictions.      Sincerely,        Kyaw Waller, DO

## 2023-12-20 ENCOUNTER — OFFICE VISIT (OUTPATIENT)
Dept: URGENT CARE | Facility: URGENT CARE | Age: 53
End: 2023-12-20
Payer: COMMERCIAL

## 2023-12-20 VITALS
RESPIRATION RATE: 17 BRPM | TEMPERATURE: 97.5 F | OXYGEN SATURATION: 98 % | WEIGHT: 186 LBS | HEART RATE: 75 BPM | SYSTOLIC BLOOD PRESSURE: 126 MMHG | DIASTOLIC BLOOD PRESSURE: 82 MMHG

## 2023-12-20 DIAGNOSIS — G56.01 CARPAL TUNNEL SYNDROME OF RIGHT WRIST: Primary | ICD-10-CM

## 2023-12-20 DIAGNOSIS — R21 RASH AND NONSPECIFIC SKIN ERUPTION: ICD-10-CM

## 2023-12-20 PROCEDURE — 99214 OFFICE O/P EST MOD 30 MIN: CPT | Performed by: PHYSICIAN ASSISTANT

## 2023-12-20 RX ORDER — PREDNISONE 20 MG/1
20 TABLET ORAL DAILY
Qty: 5 TABLET | Refills: 0 | Status: SHIPPED | OUTPATIENT
Start: 2023-12-20 | End: 2023-12-25

## 2023-12-20 NOTE — LETTER
December 20, 2023      Carissa Gordillo  [address] [facility] [address]        To Whom It May Concern:    Carissa Gordillo was seen in our clinic today for carpal tunnel syndrome.  She may return to work tomorrow with an ergonomic work station as discussed.        Sincerely,            Jaquelin DANIELS, ROSEMARIE

## 2023-12-20 NOTE — PROGRESS NOTES
"Patient presents with:  Urgent Care: Right arm bug bite onset this morning pulling and stinging feeling.     (G56.01) Carpal tunnel syndrome of right wrist  (primary encounter diagnosis)  Comment:   Plan: Wrist/Arm/Hand Bracking Supplies Order Wrist         Brace; Right; non-thumb spica, predniSONE         (DELTASONE) 20 MG tablet, Physical Therapy         Referral     Referral to physical therapy.    Hands at a 90 degree angle with upper arm while typing as modeled during visit today.  Ergonomic desk set up    Start steroid tomorrow.  Ibuprofen as needed.               (R21) Rash and nonspecific skin eruption  Comment: abrasion versus small spider bite versus?  Plan: symptomatic treatment with moisturizing lotion or topical otc steroid.       At the end of the encounter, I discussed results, diagnosis, medications. Discussed red flags for immediate return to clinic/ER, as well as indications for follow up if no improvement. Patient understood and agreed to plan. Patient was stable for discharge         SUBJECTIVE:   Carissa Gordillo is a 53 year old female who presents today with a right wrist discomfort.  Noticed a small red area on her wrist, and her wrist feels tight like its \"pulling\".    She is right-hand dominant and types throughout the day.        Current Outpatient Medications   Medication Sig Dispense Refill    Multiple Vitamins-Iron (DAILY-RABIA/IRON/BETA-CAROTENE) TABS TAKE 1 TABLET BY MOUTH DAILY. (Patient not taking: Reported on 10/19/2020) 30 tablet 7     Social History     Tobacco Use    Smoking status: Never Smoker    Smokeless tobacco: Never Used   Substance Use Topics    Alcohol use: Not on file     Family History   Problem Relation Age of Onset    Diabetes Mother     Diabetes Father          ROS:    10 point ROS of systems including Constitutional, Eyes, Respiratory, Cardiovascular, Gastroenterology, Genitourinary, Integumentary, Muscularskeletal, Psychiatric ,neurological were all negative except " for pertinent positives noted in my HPI       OBJECTIVE:  /82 (BP Location: Left arm, Patient Position: Sitting, Cuff Size: Adult Regular)   Pulse 75   Temp 97.5  F (36.4  C) (Tympanic)   Resp 17   Wt 84.4 kg (186 lb)   SpO2 98%   Physical Exam:  GENERAL APPEARANCE: healthy, alert and no distress  Extremities: Right wrist positive Tinel's.  Range of motion slightly decreased secondary to discomfort.  No swelling.  NEURO: Normal strength and tone, sensory exam grossly normal,  normal speech and mentation  SKIN: Superficial abrasion without significant erythema or warmth or drainage on right volar aspect of wrist.

## 2023-12-20 NOTE — PATIENT INSTRUCTIONS
(G56.01) Carpal tunnel syndrome of right wrist  (primary encounter diagnosis)  Comment:   Plan: Wrist/Arm/Hand Bracking Supplies Order Wrist         Brace; Right; non-thumb spica, predniSONE         (DELTASONE) 20 MG tablet, Physical Therapy         Referral            Referral to physical therapy.    Hands at a 90 degree angle with upper arm while typing as modeled during visit today.  Ergonomic desk set up    Start steroid tomorrow.  Ibuprofen as needed.

## 2024-05-13 ENCOUNTER — OFFICE VISIT (OUTPATIENT)
Dept: URGENT CARE | Facility: URGENT CARE | Age: 54
End: 2024-05-13
Payer: COMMERCIAL

## 2024-05-13 VITALS
RESPIRATION RATE: 16 BRPM | SYSTOLIC BLOOD PRESSURE: 102 MMHG | HEART RATE: 75 BPM | DIASTOLIC BLOOD PRESSURE: 70 MMHG | OXYGEN SATURATION: 99 % | TEMPERATURE: 97.9 F

## 2024-05-13 DIAGNOSIS — L23.9 ALLERGIC CONTACT DERMATITIS, UNSPECIFIED TRIGGER: Primary | ICD-10-CM

## 2024-05-13 PROCEDURE — 99213 OFFICE O/P EST LOW 20 MIN: CPT | Performed by: FAMILY MEDICINE

## 2024-05-13 RX ORDER — TRIAMCINOLONE ACETONIDE 1 MG/G
CREAM TOPICAL 2 TIMES DAILY
Qty: 30 G | Refills: 0 | Status: SHIPPED | OUTPATIENT
Start: 2024-05-13 | End: 2024-05-27

## 2024-05-13 RX ORDER — LISINOPRIL 10 MG/1
10 TABLET ORAL
COMMUNITY

## 2024-05-13 RX ORDER — CETIRIZINE HYDROCHLORIDE 10 MG/1
10 TABLET ORAL DAILY
Qty: 14 TABLET | Refills: 0 | Status: SHIPPED | OUTPATIENT
Start: 2024-05-13 | End: 2024-05-27

## 2024-05-13 NOTE — LETTER
May 13, 2024      Carissa Gordillo  [address] [facility] [address]        To Whom It May Concern:    Carissa Gordillo was seen in our clinic. She may return to work today without restrictions.      Sincerely,        Kyaw Waller, DO

## 2024-05-13 NOTE — PROGRESS NOTES
SUBJECTIVE: Carissa Gordillo is a 53 year old female presenting with a chief complaint of itchy forearm rash after working in garden.  Onset of symptoms was 1 day(s) ago.  Course of illness is worsening.      No past medical history on file.  Allergies   Allergen Reactions    Iodine Hives    Beef (Bovine) Protein Other (See Comments)     diarrhea    Beef-Derived Products Other (See Comments)     diarrhea    Yeast Other (See Comments)     Baker's yeast    Egg Solids, Whole Other (See Comments)     Diarrhea    Pegademase Bovine Other (See Comments)     diarrhea    Saccharomyces Boulardii Other (See Comments)     Baker's yeast    Sulfa Antibiotics      Nausea and vomit    Contrast Dye Hives     CT Contrast 2 hives     Social History     Tobacco Use    Smoking status: Every Day    Smokeless tobacco: Never   Substance Use Topics    Alcohol use: Not Currently       ROS:  SKIN: no rash  GI: no vomiting    OBJECTIVE:  /70   Pulse 75   Temp 97.9  F (36.6  C) (Tympanic)   Resp 16   SpO2 99% GENERAL APPEARANCE: healthy, alert and no distress  SKIN: red rash bilateral forearms      ICD-10-CM    1. Allergic contact dermatitis, unspecified trigger  L23.9 triamcinolone (KENALOG) 0.1 % external cream     cetirizine (ZYRTEC) 10 MG tablet          Fluids/Rest, f/u if worse/not any better

## 2025-01-07 ENCOUNTER — OFFICE VISIT (OUTPATIENT)
Dept: URGENT CARE | Facility: URGENT CARE | Age: 55
End: 2025-01-07
Payer: COMMERCIAL

## 2025-01-07 VITALS
OXYGEN SATURATION: 100 % | RESPIRATION RATE: 18 BRPM | SYSTOLIC BLOOD PRESSURE: 126 MMHG | DIASTOLIC BLOOD PRESSURE: 84 MMHG | TEMPERATURE: 97.8 F | HEART RATE: 89 BPM

## 2025-01-07 DIAGNOSIS — L08.9 FACIAL INFECTION: Primary | ICD-10-CM

## 2025-01-07 PROCEDURE — 96372 THER/PROPH/DIAG INJ SC/IM: CPT | Performed by: FAMILY MEDICINE

## 2025-01-07 PROCEDURE — 99213 OFFICE O/P EST LOW 20 MIN: CPT | Mod: 25 | Performed by: FAMILY MEDICINE

## 2025-01-07 RX ORDER — CEFTRIAXONE SODIUM 1 G
1 VIAL (EA) INJECTION ONCE
Status: COMPLETED | OUTPATIENT
Start: 2025-01-07 | End: 2025-01-07

## 2025-01-07 RX ADMIN — Medication 1 G: at 10:42

## 2025-01-07 NOTE — LETTER
January 7, 2025      Carissa Gordillo  [address] [facility] [address]        To Whom It May Concern:    Carissa Gordillo was seen in our clinic today.        Sincerely,      Kyaw Waller      Electronically signed

## 2025-01-07 NOTE — PROGRESS NOTES
SUBJECTIVE: Carissa Gordillo is a 54 year old female presenting with a chief complaint of left sided facial swelling warm red, from possible bad teeth.  Onset of symptoms was day(s) ago.  Course of illness is worsening.      No past medical history on file.  Allergies   Allergen Reactions    Iodine Hives    Beef (Bovine) Protein Other (See Comments)     diarrhea    Beef-Derived Drug Products Other (See Comments)     diarrhea    Yeast Other (See Comments)     Baker's yeast    Egg Solids, Whole Other (See Comments)     Diarrhea    Pegademase Bovine Other (See Comments)     diarrhea    Saccharomyces Boulardii Other (See Comments)     Baker's yeast    Sulfa Antibiotics      Nausea and vomit    Contrast Dye Hives     CT Contrast 2 hives    Latex Rash     Social History     Tobacco Use    Smoking status: Every Day    Smokeless tobacco: Never   Substance Use Topics    Alcohol use: Not Currently       ROS:  SKIN: no rash  GI: no vomiting    OBJECTIVE:  /84 (BP Location: Right arm, Patient Position: Sitting, Cuff Size: Adult Regular)   Pulse 89   Temp 97.8  F (36.6  C) (Tympanic)   Resp 18   SpO2 100% GENERAL APPEARANCE: healthy, alert and no distress  EYES: EOMI,  PERRL, conjunctiva clear  HENT: ear canals and TM's normal.  Nose and mouth without ulcers, erythema or lesions  NECK: supple, nontender, no lymphadenopathy  SKIN: left cheek and facial swelling/redness warm      ICD-10-CM    1. Facial infection  L08.9 cefTRIAXone (ROCEPHIN) in lidocaine 1% for IM administration 1 g     amoxicillin-clavulanate (AUGMENTIN) 875-125 MG tablet        F/up Dental  ED if worse  Fluids/Rest, f/u if worse/not any better

## 2025-01-07 NOTE — NURSING NOTE
Clinic Administered Medication Documentation        Patient was given Rocephin. Prior to medication administration, verified patient's identity using patient s name and date of birth. Please see MAR and medication order for additional information. Patient instructed to remain in clinic for 15 minutes and report any adverse reaction to staff immediately.    Vial/Syringe: Single dose vial. Was entire vial of medication used? Yes  Erin Engle LPN

## 2025-06-13 ENCOUNTER — ANCILLARY PROCEDURE (OUTPATIENT)
Dept: GENERAL RADIOLOGY | Facility: CLINIC | Age: 55
End: 2025-06-13
Attending: PHYSICIAN ASSISTANT
Payer: COMMERCIAL

## 2025-06-13 DIAGNOSIS — R06.2 WHEEZING: ICD-10-CM

## 2025-06-13 DIAGNOSIS — R09.89 CHEST CONGESTION: ICD-10-CM

## 2025-06-13 PROCEDURE — 71046 X-RAY EXAM CHEST 2 VIEWS: CPT | Mod: TC | Performed by: RADIOLOGY
